# Patient Record
Sex: FEMALE | Race: OTHER | Employment: OTHER | URBAN - METROPOLITAN AREA
[De-identification: names, ages, dates, MRNs, and addresses within clinical notes are randomized per-mention and may not be internally consistent; named-entity substitution may affect disease eponyms.]

---

## 2022-09-08 ENCOUNTER — TELEPHONE (OUTPATIENT)
Dept: FAMILY PLANNING/WOMEN'S HEALTH CLINIC | Age: 37
End: 2022-09-08

## 2022-09-08 NOTE — TELEPHONE ENCOUNTER
Patient referred by [facility]. Per provider patient has a R breast mass. Spoke with patient on phone to screen for [facility]. Overview of program given. Eligibility criteria reviewed. Client does meet all qualifications and does want to enter into program. Patient has been scheduled for [facility] appointment. Pap is not needed.

## 2022-09-15 ENCOUNTER — OFFICE VISIT (OUTPATIENT)
Dept: FAMILY PLANNING/WOMEN'S HEALTH CLINIC | Age: 37
End: 2022-09-15

## 2022-09-15 ENCOUNTER — HOSPITAL ENCOUNTER (OUTPATIENT)
Dept: MAMMOGRAPHY | Age: 37
Discharge: HOME | End: 2022-09-15

## 2022-09-15 DIAGNOSIS — R22.9 SKIN NODULE: Primary | ICD-10-CM

## 2022-09-15 DIAGNOSIS — Z87.898 HISTORY OF BREAST PROBLEM: ICD-10-CM

## 2022-09-15 DIAGNOSIS — N63.10 MASS OF BREAST, RIGHT: ICD-10-CM

## 2022-09-15 NOTE — PROGRESS NOTES
Assessment/Plan:    Diagnoses and all orders for this visit:    1. Skin nodule    2. History of breast problem  Pt was referred from outside facility for a breast problem with a skin mass growing to current size over the last 1.5 years or so  The problem is not one that [facility] will cover the cost of the imaging for and instead I have advised the pt to call the [facility] to get established as a pt and then I will look for derm referral  The nodular mass is superficial and soft and the size of a small grape on wide stalk          Deyanira Irving, PA-C  [address] expressed understanding of this plan. An AVS was printed and given to the patient.      ----------------------------------------------------------------------    No chief complaint on file. History of Present Illness:  [facility] referral for skin mass  Growing to current size over the past 1 1/2 years or so  Having periods monthly. Not on any form of birth control  [employer] interpretor through [facility] was used for the appt  Explained to the pt that the last will not cover the cost of imaging as her actual breast exam is normal- this is a skin nodule        History reviewed. No pertinent past medical history. No Known Allergies    Social History     Tobacco Use    Smoking status: Never    Smokeless tobacco: Never       History reviewed. No pertinent family history. Physical Exam:     Visit Vitals  LMP 09/11/2022 (Approximate)       A&Ox3  WDWN NAD  Respirations normal and non labored  Breast exam- keila neg for mass, tenderness, skin color change or dimpling.  Right breast 3 oclock she has a soft tissue growth size of a small grape, nodular in consistency

## 2022-09-15 NOTE — PROGRESS NOTES
[facility] [place]  used ID [id number]     [facility] HISTORY QUESTIONNAIRE       No Yes Comments   Has a doctor ever seen or felt anything wrong with your breast? []                                  [x]                                  A wart in the right breast   Have you ever had a breast biopsy? [x]                                  []                                          When and where was last mammogram performed?  none    Have you ever been told that there was a problem on your mammogram?   No Yes Comments   []                                  []                                  N/a     Do you have breast implants? No Yes Comments   [x]                                  []                                       When was your last Pap test performed? 08/31/2022 at [facility]    Have you ever had an abnormal Pap test?   No Yes Comments   [x]                                  []                                       Have you had a hysterectomy? No Yes Comments (why)   [x]                                  []                                       Have you been through menopause? No Yes Date of LMP   [x]                                  []                                  9/11/2022     Did your mother take CECY? No Yes Unknown   []                                  []                                  x     Do you have a history of HIV exposure? No Yes    [x]                                  []                                       Have you ever been diagnosed with any type of Cancer   No Yes Comments (type,when,where,type of treatment   [x]                                  []                                          Has a family member been diagnosed with breast or ovarian cancer?    No Yes Comments (which family members, and type   [x]                                  []                                       Are you taking hormone replacement therapy (HRT)     No Yes Comments   [x] []                                       How many times have you been pregnant? 3        Number of live births ? 3    Are you experiencing any of the following? No Yes Comments   Nipple Discharge [x]                                  []                                     Breast Lump/Masses [x]                                  []                                     Breast Skin Changes [x]                                  []                                          No Yes Comments   Vaginal Discharge [x]                                  []                                     Abnormal/unusual vaginal bleeding [x]                                  []                                         Are you experiencing any other health problems? None reported      Age at first period?  12  Age at first birth? 21        Wt-- 182 lbs